# Patient Record
Sex: MALE | Race: WHITE | NOT HISPANIC OR LATINO | Employment: FULL TIME | URBAN - METROPOLITAN AREA
[De-identification: names, ages, dates, MRNs, and addresses within clinical notes are randomized per-mention and may not be internally consistent; named-entity substitution may affect disease eponyms.]

---

## 2020-09-15 ENCOUNTER — OFFICE VISIT (OUTPATIENT)
Dept: GASTROENTEROLOGY | Facility: CLINIC | Age: 45
End: 2020-09-15
Payer: COMMERCIAL

## 2020-09-15 VITALS
HEIGHT: 67 IN | WEIGHT: 295 LBS | DIASTOLIC BLOOD PRESSURE: 80 MMHG | TEMPERATURE: 98 F | HEART RATE: 76 BPM | BODY MASS INDEX: 46.3 KG/M2 | SYSTOLIC BLOOD PRESSURE: 120 MMHG

## 2020-09-15 DIAGNOSIS — R10.13 EPIGASTRIC PAIN: Primary | ICD-10-CM

## 2020-09-15 DIAGNOSIS — R10.10 UPPER ABDOMINAL PAIN: ICD-10-CM

## 2020-09-15 PROCEDURE — 99244 OFF/OP CNSLTJ NEW/EST MOD 40: CPT | Performed by: NURSE PRACTITIONER

## 2020-09-15 RX ORDER — DICYCLOMINE HCL 20 MG
20 TABLET ORAL 3 TIMES DAILY
COMMUNITY
Start: 2020-09-06

## 2020-09-15 NOTE — PATIENT INSTRUCTIONS
Check HIDA scan-I will contact you with HIDA scan results and next steps   Avoid NSAID's (Ibuprofen etc)   Small, frequent meals     Follow up in 4-6 weeks       Diet for Stomach Ulcers and Gastritis   WHAT YOU NEED TO KNOW:   A diet for stomach ulcers and gastritis is a meal plan that limits foods that irritate your stomach  Certain foods may worsen symptoms such as stomach pain, bloating, heartburn, or indigestion  DISCHARGE INSTRUCTIONS:   Foods to limit or avoid:  You may need to avoid acidic, spicy, or high-fat foods  Not all foods affect everyone the same way  You will need to learn which foods worsen your symptoms and limit those foods  The following are some foods that may worsen ulcer or gastritis symptoms:  · Beverages:      ¨ Whole milk and chocolate milk    ¨ Hot cocoa and cola    ¨ Any beverage with caffeine    ¨ Regular and decaffeinated coffee    ¨ Peppermint and spearmint tea    ¨ Green and black tea, with or without caffeine    ¨ Orange and grapefruit juices    ¨ Drinks that contain alcohol    · Spices and seasonings:      ¨ Black and red pepper    ¨ Chili powder    ¨ Mustard seed and nutmeg    · Other foods:      ¨ Dairy foods made from whole milk or cream    ¨ Chocolate    ¨ Spicy or strongly flavored cheeses, such as jalapeno or black pepper    ¨ Highly seasoned, high-fat meats, such as sausage, salami, diop, ham, and cold cuts    ¨ Hot chiles and peppers    ¨ Tomato products, such as tomato paste, tomato sauce, or tomato juice  Foods to include:  Eat a variety of healthy foods from all the food groups  Eat fruits, vegetables, whole grains, and fat-free or low-fat dairy foods  Whole grains include whole-wheat breads, cereals, pasta, and brown rice  Choose lean meats, poultry (chicken and turkey), fish, beans, eggs, and nuts  A healthy meal plan is low in unhealthy fats, salt, and added sugar  Healthy fats include olive oil and canola oil   Ask your dietitian for more information about a healthy diet   Other helpful guidelines:   · Do not eat right before bedtime  Stop eating at least 2 hours before bedtime  · Eat small, frequent meals  Your stomach may tolerate small, frequent meals better than large meals  © 2017 [address] [employer] Information is for End User's use only and may not be sold, redistributed or otherwise used for commercial purposes  All illustrations and images included in CareNotes® are the copyrighted property of [employer]  or [employer]  The above information is an  only  It is not intended as medical advice for individual conditions or treatments  Talk to your doctor, nurse or pharmacist before following any medical regimen to see if it is safe and effective for you

## 2020-09-15 NOTE — PROGRESS NOTES
[id number] - Outpatient Consultation  Tani Franco 54 y o  male MRN: [id number]  Encounter: [id number]    ASSESSMENT AND PLAN:      1  Epigastric pain  2  Upper abdominal pain  Episode of epigastric, upper abdominal pain that radiated to back 2 weeks ago  He was seen in the emergency department at [facility] on 09/06/2020  CMP was normal   Hemoglobin 13 0 but rest of CBC was unremarkable  Ultrasound of the abdomen was negative  He denies any further episodes of pain  Differential diagnoses include biliary dyskinesia, gastritis, esophagitis, peptic ulcer disease  He is at risk for peptic ulcer disease given regular NSAID use  - will check [facility] hepatobiliary w rx; Future  - will arrange for EGD after HIDA scan is obtained-Class III airway (should be performed in hospital setting, pt prefers [facility])   - avoid NSAIDs  - encouraged small, frequent meals  - GERD lifestyle modifications discussed with patient      Followup Appointment: 6 weeks   ______________________________________________________________________    Chief Complaint   Patient presents with    Epigastric pain     radiates to back; Referred by PCP    Abd distention       HPI:   Tani Franco is a 54y o  year old male who presents to the office today as a new patient  He was referred to us by his PCP NAKITA Terry  About 2 weeks ago, patient experienced an episode of extreme epigastric pain that radiated across both sides of his upper abdomen and to both sides of his mid back  His symptoms lasted for several hours and then resolved  The next few days he felt sore and bloated in his abdomen  He then experienced significant pain on 09/06/2020 and was seen in the emergency department at [facility]   He reports that he has had similar episodes of pain in the past, has had about 2 episodes this year  He does report that he occasionally will have postprandial abdominal pain    He does report a history of heartburn and reflux in the past and occasionally will take Tums, this occurs infrequently  He did have an upper endoscopy many years ago but is unsure of results  He denies any fever, chills, dysphagia, odynophagia, early satiety, nausea, vomiting, change in bowel habits, hematochezia or melena  He has a history of hip in knee pain and does use ibuprofen on a regular basis  His appetite is stable  He denies any unintentional weight loss  Historical Information   Past Medical History:   Diagnosis Date    Joint pain      History reviewed  No pertinent surgical history  Social History     Substance and Sexual Activity   Alcohol Use Yes    Frequency: 2-4 times a month     Social History     Substance and Sexual Activity   Drug Use Never     Social History     Tobacco Use   Smoking Status Former Smoker   Smokeless Tobacco Never Used     Family History   Problem Relation Age of Onset    Cancer Mother     Cancer Father     Heart attack Father     Colon polyps Brother     Colon cancer Neg Hx        Meds/Allergies     Current Outpatient Medications:     dicyclomine (BENTYL) 20 mg tablet    No Known Allergies    PHYSICAL EXAM:    Blood pressure 120/80, pulse 76, temperature 98 °F (36 7 °C), height 5' 7" (1 702 m), weight 134 kg (295 lb)  Body mass index is 46 2 kg/m²  General Appearance: NAD, cooperative, alert  Eyes: Anicteric, PERRLA, EOMI  ENT:  Normocephalic, atraumatic, normal mucosa  Neck:  Supple, symmetrical, trachea midline,   Resp:  Clear to auscultation bilaterally; no rales, rhonchi or wheezing; respirations unlabored   CV:  S1 S2, Regular rate and rhythm; no murmur, rub, or gallop  GI:  Soft, non-tender, non-distended; normal bowel sounds; no masses, no organomegaly   Rectal: Deferred  Musculoskeletal: No cyanosis, clubbing or edema  Normal ROM    Skin:  No jaundice, rashes, or lesions   Heme/Lymph: No palpable cervical lymphadenopathy  Psych: Normal affect, good eye contact  Neuro: No gross deficits, AAOx3    Lab Results:   No results found for: WBC, HGB, HCT, MCV, PLT  No results found for: NA, K, CL, CO2, ANIONGAP, BUN, CREATININE, GLUCOSE, GLUF, CALCIUM, CORRECTEDCA, AST, ALT, ALKPHOS, PROT, BILITOT, EGFR  No results found for: IRON, TIBC, FERRITIN  No results found for: LIPASE    Radiology Results:   No results found  REVIEW OF SYSTEMS:    CONSTITUTIONAL: Denies any fever, chills, rigors, and weight loss  HEENT: No earache or tinnitus  Denies hearing loss or visual disturbances  CARDIOVASCULAR: No chest pain or palpitations  RESPIRATORY: Denies any cough, hemoptysis, shortness of breath or dyspnea on exertion  GASTROINTESTINAL: As noted in the History of Present Illness  GENITOURINARY: No problems with urination  Denies any hematuria or dysuria  NEUROLOGIC: No dizziness or vertigo, denies headaches  MUSCULOSKELETAL: Denies any muscle or joint pain  SKIN: Denies skin rashes or itching  ENDOCRINE: Denies excessive thirst  Denies intolerance to heat or cold  PSYCHOSOCIAL: Denies depression or anxiety  Denies any recent memory loss

## 2020-09-18 ENCOUNTER — TELEPHONE (OUTPATIENT)
Dept: GASTROENTEROLOGY | Facility: CLINIC | Age: 45
End: 2020-09-18

## 2020-09-18 DIAGNOSIS — R10.13 EPIGASTRIC PAIN: Primary | ICD-10-CM

## 2020-09-18 NOTE — TELEPHONE ENCOUNTER
Called pt's mom back, she will call his PCP for a written referral for his HIDA scan and I will investigate a prior auth with our precert department  Message left for [facility]

## 2020-09-18 NOTE — TELEPHONE ENCOUNTER
Pt's mother, June, states he had a nuclear med test at [facility] this morning and was told he needed this office to send a referral  Daryn Prieto was not w/ Pt and he is at work/unable to make call  Her CB# [phone]  If questions for Pt Mom will message him

## 2020-09-21 NOTE — TELEPHONE ENCOUNTER
Called and left a voicemail for patient to return call  HIDA scan shows low gallbladder ejection fraction of 5% compatible with gallbladder dysfunction  He will need a referral to surgery  I will attempt to contact patient again tomorrow if he does not return call

## 2020-09-22 ENCOUNTER — TELEPHONE (OUTPATIENT)
Dept: GASTROENTEROLOGY | Facility: CLINIC | Age: 45
End: 2020-09-22

## 2020-09-22 NOTE — TELEPHONE ENCOUNTER
In case it is needed his mother Yaneli Storey is listed as medical communication and there is a cell phone number on form [phone]  I have placed referral into [facility] for [facility] and will fax records after you speak with them

## 2020-09-22 NOTE — TELEPHONE ENCOUNTER
Called and spoke with pt's mother Yaneli  Reviewed HIDA scan results with her  Information provided to [facility], I would recommend that pt follow up with a surgeon  He has not scheduled EGD yet, we can discuss this at upcoming [address] on 10/28/20

## 2020-10-27 ENCOUNTER — TELEPHONE (OUTPATIENT)
Dept: GASTROENTEROLOGY | Facility: CLINIC | Age: 45
End: 2020-10-27